# Patient Record
Sex: FEMALE | Race: WHITE | NOT HISPANIC OR LATINO | URBAN - METROPOLITAN AREA
[De-identification: names, ages, dates, MRNs, and addresses within clinical notes are randomized per-mention and may not be internally consistent; named-entity substitution may affect disease eponyms.]

---

## 2017-04-05 ENCOUNTER — NEW PATIENT (OUTPATIENT)
Dept: URBAN - METROPOLITAN AREA CLINIC 10 | Facility: CLINIC | Age: 72
End: 2017-04-05
Payer: MEDICARE

## 2017-04-05 DIAGNOSIS — H35.371 PUCKERING OF MACULA, RIGHT EYE: ICD-10-CM

## 2017-04-05 DIAGNOSIS — H01.011 ULCERATIVE BLEPHARITIS OF RIGHT UPPER LID: ICD-10-CM

## 2017-04-05 DIAGNOSIS — H25.813 COMBINED FORMS OF AGE-RELATED CATARACT, BILATERAL: Primary | ICD-10-CM

## 2017-04-05 DIAGNOSIS — H01.015 ULCERATIVE BLEPHARITIS OF LEFT LOWER LID: ICD-10-CM

## 2017-04-05 DIAGNOSIS — H01.012 ULCERATIVE BLEPHARITIS OF RIGHT LOWER LID: ICD-10-CM

## 2017-04-05 DIAGNOSIS — H43.812 VITREOUS DEGENERATION, LEFT EYE: ICD-10-CM

## 2017-04-05 DIAGNOSIS — H01.014 ULCERATIVE BLEPHARITIS OF LEFT UPPER LID: ICD-10-CM

## 2017-04-05 PROCEDURE — 92004 COMPRE OPH EXAM NEW PT 1/>: CPT | Performed by: OPTOMETRIST

## 2017-04-05 PROCEDURE — 92134 CPTRZ OPH DX IMG PST SGM RTA: CPT | Performed by: OPTOMETRIST

## 2017-04-05 ASSESSMENT — KERATOMETRY
OD: 44.50
OS: 45.25

## 2017-04-05 ASSESSMENT — VISUAL ACUITY
OD: 20/60
OS: 20/30

## 2017-04-05 ASSESSMENT — INTRAOCULAR PRESSURE
OS: 18
OD: 18

## 2017-04-26 ENCOUNTER — Encounter (OUTPATIENT)
Dept: URBAN - METROPOLITAN AREA CLINIC 10 | Facility: CLINIC | Age: 72
End: 2017-04-26
Payer: MEDICARE

## 2017-04-26 PROCEDURE — 92025 CPTRIZED CORNEAL TOPOGRAPHY: CPT | Performed by: OPHTHALMOLOGY

## 2017-04-26 PROCEDURE — 99213 OFFICE O/P EST LOW 20 MIN: CPT | Performed by: PHYSICIAN ASSISTANT

## 2017-04-26 RX ORDER — ALPRAZOLAM 0.5 MG/1
0.5 MG TABLET ORAL
Qty: 0 | Refills: 0 | Status: INACTIVE
Start: 2017-04-26 | End: 2017-10-11

## 2017-04-26 RX ORDER — FLUTICASONE PROPIONATE 50 UG/1
SPRAY, METERED NASAL
Qty: 0 | Refills: 0 | Status: ACTIVE
Start: 2017-04-26

## 2017-04-26 RX ORDER — ONDANSETRON 4 MG/1
4 MG TABLET, FILM COATED ORAL
Qty: 0 | Refills: 0 | Status: INACTIVE
Start: 2017-04-26 | End: 2020-12-16

## 2017-04-26 RX ORDER — LEVOTHYROXINE SODIUM 75 UG/1
TABLET ORAL
Qty: 0 | Refills: 0 | Status: INACTIVE
Start: 2017-04-26 | End: 2020-12-16

## 2017-06-16 ENCOUNTER — Encounter (OUTPATIENT)
Dept: URBAN - METROPOLITAN AREA CLINIC 10 | Facility: CLINIC | Age: 72
End: 2017-06-16
Payer: MEDICARE

## 2017-06-16 PROCEDURE — 99213 OFFICE O/P EST LOW 20 MIN: CPT | Performed by: PHYSICIAN ASSISTANT

## 2017-06-16 RX ORDER — VILAZODONE HYDROCHLORIDE 40 MG/1
40 MG TABLET ORAL
Qty: 0 | Refills: 0 | Status: INACTIVE
Start: 2017-06-16 | End: 2018-02-23

## 2017-06-16 RX ORDER — COLESEVELAM HYDROCHLORIDE 625 MG/1
625 MG TABLET, FILM COATED ORAL
Qty: 0 | Refills: 0 | Status: INACTIVE
Start: 2017-06-16 | End: 2017-10-11

## 2017-06-16 RX ORDER — RANITIDINE 300 MG/1
300 MG TABLET, FILM COATED ORAL
Qty: 0 | Refills: 0 | Status: INACTIVE
Start: 2017-06-16 | End: 2017-10-11

## 2017-06-21 ENCOUNTER — FOLLOW UP ESTABLISHED (OUTPATIENT)
Dept: URBAN - METROPOLITAN AREA CLINIC 44 | Facility: CLINIC | Age: 72
End: 2017-06-21
Payer: MEDICARE

## 2017-06-21 DIAGNOSIS — H25.811 COMBINED FORMS OF AGE-RELATED CATARACT, RIGHT EYE: Primary | ICD-10-CM

## 2017-06-21 DIAGNOSIS — H25.812 COMBINED FORMS OF AGE-RELATED CATARACT, LEFT EYE: ICD-10-CM

## 2017-06-21 PROCEDURE — 99213 OFFICE O/P EST LOW 20 MIN: CPT | Performed by: OPHTHALMOLOGY

## 2017-06-21 RX ORDER — DICLOFENAC SODIUM 1 MG/ML
0.1 % SOLUTION/ DROPS OPHTHALMIC
Qty: 1 | Refills: 2 | Status: INACTIVE
Start: 2017-06-21 | End: 2017-07-14

## 2017-06-21 RX ORDER — PREDNISOLONE ACETATE 10 MG/ML
1 % SUSPENSION/ DROPS OPHTHALMIC
Qty: 1 | Refills: 2 | Status: INACTIVE
Start: 2017-06-21 | End: 2017-07-14

## 2017-06-21 ASSESSMENT — INTRAOCULAR PRESSURE
OD: 17
OS: 18

## 2017-06-29 ENCOUNTER — SURGERY (OUTPATIENT)
Dept: URBAN - METROPOLITAN AREA SURGERY 5 | Facility: SURGERY | Age: 72
End: 2017-06-29
Payer: MEDICARE

## 2017-06-29 PROCEDURE — 66984 XCAPSL CTRC RMVL W/O ECP: CPT | Performed by: OPHTHALMOLOGY

## 2017-06-30 ENCOUNTER — POST OP (OUTPATIENT)
Dept: URBAN - METROPOLITAN AREA CLINIC 10 | Facility: CLINIC | Age: 72
End: 2017-06-30

## 2017-06-30 PROCEDURE — 99024 POSTOP FOLLOW-UP VISIT: CPT | Performed by: OPTOMETRIST

## 2017-06-30 ASSESSMENT — INTRAOCULAR PRESSURE: OD: 13

## 2017-07-05 ENCOUNTER — POST OP (OUTPATIENT)
Dept: URBAN - METROPOLITAN AREA CLINIC 10 | Facility: CLINIC | Age: 72
End: 2017-07-05

## 2017-07-05 PROCEDURE — 99024 POSTOP FOLLOW-UP VISIT: CPT | Performed by: OPTOMETRIST

## 2017-07-05 ASSESSMENT — VISUAL ACUITY
OD: 20/25
OS: 20/40

## 2017-07-05 ASSESSMENT — INTRAOCULAR PRESSURE
OS: 14
OD: 15

## 2017-07-13 ENCOUNTER — SURGERY (OUTPATIENT)
Dept: URBAN - METROPOLITAN AREA SURGERY 5 | Facility: SURGERY | Age: 72
End: 2017-07-13
Payer: MEDICARE

## 2017-07-13 PROCEDURE — 66984 XCAPSL CTRC RMVL W/O ECP: CPT | Performed by: OPHTHALMOLOGY

## 2017-07-14 ENCOUNTER — POST OP (OUTPATIENT)
Dept: URBAN - METROPOLITAN AREA CLINIC 10 | Facility: CLINIC | Age: 72
End: 2017-07-14

## 2017-07-14 PROCEDURE — 99024 POSTOP FOLLOW-UP VISIT: CPT | Performed by: OPTOMETRIST

## 2017-07-14 ASSESSMENT — VISUAL ACUITY: OD: 20/25

## 2017-07-14 ASSESSMENT — INTRAOCULAR PRESSURE
OS: 15
OD: 15

## 2017-08-03 ENCOUNTER — POST OP (OUTPATIENT)
Dept: URBAN - METROPOLITAN AREA CLINIC 64 | Facility: CLINIC | Age: 72
End: 2017-08-03

## 2017-08-03 PROCEDURE — 99024 POSTOP FOLLOW-UP VISIT: CPT | Performed by: OPTOMETRIST

## 2017-08-03 ASSESSMENT — INTRAOCULAR PRESSURE
OS: 19
OD: 15

## 2017-08-03 ASSESSMENT — VISUAL ACUITY
OS: 20/25
OD: 20/20

## 2017-10-11 ENCOUNTER — Encounter (OUTPATIENT)
Dept: URBAN - METROPOLITAN AREA CLINIC 10 | Facility: CLINIC | Age: 72
End: 2017-10-11
Payer: MEDICARE

## 2017-10-11 DIAGNOSIS — Z01.818 ENCOUNTER FOR OTHER PREPROCEDURAL EXAMINATION: Primary | ICD-10-CM

## 2017-10-11 PROCEDURE — 99213 OFFICE O/P EST LOW 20 MIN: CPT | Performed by: PHYSICIAN ASSISTANT

## 2017-10-18 ENCOUNTER — SURGERY (OUTPATIENT)
Dept: URBAN - METROPOLITAN AREA SURGERY 19 | Facility: SURGERY | Age: 72
End: 2017-10-18
Payer: MEDICARE

## 2017-10-18 PROCEDURE — 66821 AFTER CATARACT LASER SURGERY: CPT | Performed by: OPHTHALMOLOGY

## 2017-10-27 ENCOUNTER — POST OP (OUTPATIENT)
Dept: URBAN - METROPOLITAN AREA CLINIC 10 | Facility: CLINIC | Age: 72
End: 2017-10-27

## 2017-10-27 PROCEDURE — 99024 POSTOP FOLLOW-UP VISIT: CPT | Performed by: OPTOMETRIST

## 2017-10-27 ASSESSMENT — INTRAOCULAR PRESSURE
OD: 15
OS: 19

## 2017-10-27 ASSESSMENT — VISUAL ACUITY
OS: 20/25
OD: 20/20

## 2017-11-09 ENCOUNTER — SURGERY (OUTPATIENT)
Dept: URBAN - METROPOLITAN AREA SURGERY 5 | Facility: SURGERY | Age: 72
End: 2017-11-09
Payer: MEDICARE

## 2017-11-09 PROCEDURE — 66821 AFTER CATARACT LASER SURGERY: CPT | Performed by: OPHTHALMOLOGY

## 2018-02-23 ENCOUNTER — FOLLOW UP ESTABLISHED (OUTPATIENT)
Dept: URBAN - METROPOLITAN AREA CLINIC 10 | Facility: CLINIC | Age: 73
End: 2018-02-23
Payer: MEDICARE

## 2018-02-23 PROCEDURE — 92014 COMPRE OPH EXAM EST PT 1/>: CPT | Performed by: OPTOMETRIST

## 2018-02-23 ASSESSMENT — KERATOMETRY
OD: 44.50
OS: 44.50

## 2018-02-23 ASSESSMENT — VISUAL ACUITY
OD: 20/20
OS: 20/20

## 2018-02-23 ASSESSMENT — INTRAOCULAR PRESSURE
OD: 16
OS: 17

## 2018-09-14 ENCOUNTER — FOLLOW UP ESTABLISHED (OUTPATIENT)
Dept: URBAN - METROPOLITAN AREA CLINIC 64 | Facility: CLINIC | Age: 73
End: 2018-09-14
Payer: MEDICARE

## 2018-09-14 PROCEDURE — 92014 COMPRE OPH EXAM EST PT 1/>: CPT | Performed by: OPTOMETRIST

## 2018-09-14 PROCEDURE — 92134 CPTRZ OPH DX IMG PST SGM RTA: CPT | Performed by: OPTOMETRIST

## 2018-09-14 ASSESSMENT — INTRAOCULAR PRESSURE
OD: 14
OS: 15

## 2018-09-15 ENCOUNTER — SURGERY (OUTPATIENT)
Dept: URBAN - METROPOLITAN AREA SURGERY 5 | Facility: SURGERY | Age: 73
End: 2018-09-15
Payer: MEDICARE

## 2018-09-15 PROCEDURE — 67108 REPAIR DETACHED RETINA: CPT | Performed by: OPHTHALMOLOGY

## 2018-09-15 RX ORDER — OFLOXACIN 3 MG/ML
0.3 % SOLUTION/ DROPS OPHTHALMIC
Qty: 1 | Refills: 0 | Status: INACTIVE
Start: 2018-09-15 | End: 2018-09-27

## 2018-09-15 RX ORDER — PREDNISOLONE ACETATE 10 MG/ML
1 % SUSPENSION/ DROPS OPHTHALMIC
Qty: 1 | Refills: 1 | Status: INACTIVE
Start: 2018-09-15 | End: 2018-09-27

## 2018-09-15 ASSESSMENT — INTRAOCULAR PRESSURE
OS: 16
OD: 14

## 2018-09-27 ENCOUNTER — FOLLOW UP ESTABLISHED (OUTPATIENT)
Dept: URBAN - METROPOLITAN AREA CLINIC 64 | Facility: CLINIC | Age: 73
End: 2018-09-27
Payer: MEDICARE

## 2018-09-27 PROCEDURE — 92134 CPTRZ OPH DX IMG PST SGM RTA: CPT | Performed by: OPHTHALMOLOGY

## 2018-09-27 PROCEDURE — 99024 POSTOP FOLLOW-UP VISIT: CPT | Performed by: OPHTHALMOLOGY

## 2018-09-27 RX ORDER — PREDNISOLONE ACETATE 10 MG/ML
1 % SUSPENSION/ DROPS OPHTHALMIC
Qty: 1 | Refills: 1 | Status: INACTIVE
Start: 2018-09-27 | End: 2019-01-23

## 2018-09-27 ASSESSMENT — INTRAOCULAR PRESSURE
OD: 15
OS: 13

## 2018-10-11 ENCOUNTER — POST OP (OUTPATIENT)
Dept: URBAN - METROPOLITAN AREA CLINIC 64 | Facility: CLINIC | Age: 73
End: 2018-10-11

## 2018-10-11 PROCEDURE — 99024 POSTOP FOLLOW-UP VISIT: CPT | Performed by: OPTOMETRIST

## 2018-10-11 ASSESSMENT — INTRAOCULAR PRESSURE
OD: 13
OS: 14

## 2018-11-09 ENCOUNTER — Encounter (OUTPATIENT)
Dept: URBAN - METROPOLITAN AREA CLINIC 44 | Facility: CLINIC | Age: 73
End: 2018-11-09
Payer: MEDICARE

## 2018-11-09 DIAGNOSIS — H33.011 RETINAL DETACHMENT WITH SINGLE BREAK, RIGHT EYE: ICD-10-CM

## 2018-11-09 PROCEDURE — 99213 OFFICE O/P EST LOW 20 MIN: CPT | Performed by: PHYSICIAN ASSISTANT

## 2018-11-16 ENCOUNTER — SURGERY (OUTPATIENT)
Dept: URBAN - METROPOLITAN AREA SURGERY 5 | Facility: SURGERY | Age: 73
End: 2018-11-16
Payer: MEDICARE

## 2018-11-16 PROCEDURE — 67040 LASER TREATMENT OF RETINA: CPT | Performed by: OPHTHALMOLOGY

## 2018-11-16 RX ORDER — OFLOXACIN 3 MG/ML
0.3 % SOLUTION/ DROPS OPHTHALMIC
Qty: 1 | Refills: 0 | Status: INACTIVE
Start: 2018-11-16 | End: 2019-01-23

## 2018-11-17 ENCOUNTER — POST OP (OUTPATIENT)
Dept: URBAN - METROPOLITAN AREA CLINIC 10 | Facility: CLINIC | Age: 73
End: 2018-11-17

## 2018-11-17 PROCEDURE — 99024 POSTOP FOLLOW-UP VISIT: CPT | Performed by: OPTOMETRIST

## 2018-11-17 ASSESSMENT — INTRAOCULAR PRESSURE: OD: 8

## 2018-11-30 ENCOUNTER — FOLLOW UP ESTABLISHED (OUTPATIENT)
Dept: URBAN - METROPOLITAN AREA CLINIC 10 | Facility: CLINIC | Age: 73
End: 2018-11-30

## 2018-11-30 PROCEDURE — 99024 POSTOP FOLLOW-UP VISIT: CPT | Performed by: OPHTHALMOLOGY

## 2018-11-30 PROCEDURE — 92134 CPTRZ OPH DX IMG PST SGM RTA: CPT | Performed by: OPHTHALMOLOGY

## 2018-11-30 ASSESSMENT — INTRAOCULAR PRESSURE
OS: 16
OD: 15

## 2019-01-23 ENCOUNTER — POST OP (OUTPATIENT)
Dept: URBAN - METROPOLITAN AREA CLINIC 10 | Facility: CLINIC | Age: 74
End: 2019-01-23

## 2019-01-23 PROCEDURE — 99024 POSTOP FOLLOW-UP VISIT: CPT | Performed by: OPTOMETRIST

## 2019-01-23 RX ORDER — PREDNISOLONE/GATIFLOX/NEPAFEN 1-0.5-0.1%
1 % SUSPENSION, DROPS(FINAL DOSAGE FORM)(ML) OPHTHALMIC (EYE)
Qty: 1 | Refills: 1 | Status: INACTIVE
Start: 2019-01-23 | End: 2020-09-01

## 2019-01-23 ASSESSMENT — INTRAOCULAR PRESSURE
OS: 15
OD: 13

## 2020-09-01 ENCOUNTER — FOLLOW UP ESTABLISHED (OUTPATIENT)
Dept: URBAN - METROPOLITAN AREA CLINIC 64 | Facility: CLINIC | Age: 75
End: 2020-09-01
Payer: MEDICARE

## 2020-09-01 DIAGNOSIS — H02.31 BLEPHAROCHALASIS OF RIGHT UPPER LID: ICD-10-CM

## 2020-09-01 DIAGNOSIS — H10.011 ACUTE FOLLICULAR CONJUNCTIVITIS, RIGHT EYE: Primary | ICD-10-CM

## 2020-09-01 DIAGNOSIS — H02.34 BLEPHAROCHALASIS OF LEFT UPPER LID: ICD-10-CM

## 2020-09-01 PROCEDURE — 92012 INTRM OPH EXAM EST PATIENT: CPT | Performed by: OPTOMETRIST

## 2020-09-01 RX ORDER — PREDNISOLONE ACETATE 10 MG/ML
1 % SUSPENSION/ DROPS OPHTHALMIC
Qty: 1 | Refills: 1 | Status: INACTIVE
Start: 2020-09-01 | End: 2020-10-01

## 2020-09-01 ASSESSMENT — INTRAOCULAR PRESSURE
OD: 17
OS: 15

## 2020-09-15 ENCOUNTER — FOLLOW UP ESTABLISHED (OUTPATIENT)
Dept: URBAN - METROPOLITAN AREA CLINIC 64 | Facility: CLINIC | Age: 75
End: 2020-09-15
Payer: MEDICARE

## 2020-09-15 PROCEDURE — 99213 OFFICE O/P EST LOW 20 MIN: CPT | Performed by: OPTOMETRIST

## 2020-09-15 ASSESSMENT — KERATOMETRY
OD: 45.53
OS: 44.20

## 2020-09-29 ENCOUNTER — FOLLOW UP ESTABLISHED (OUTPATIENT)
Dept: URBAN - METROPOLITAN AREA CLINIC 64 | Facility: CLINIC | Age: 75
End: 2020-09-29
Payer: MEDICARE

## 2020-09-29 PROCEDURE — 99213 OFFICE O/P EST LOW 20 MIN: CPT | Performed by: OPTOMETRIST

## 2020-09-29 ASSESSMENT — INTRAOCULAR PRESSURE
OS: 8
OD: 10

## 2020-10-01 ENCOUNTER — FOLLOW UP ESTABLISHED (OUTPATIENT)
Dept: URBAN - METROPOLITAN AREA CLINIC 64 | Facility: CLINIC | Age: 75
End: 2020-10-01
Payer: MEDICARE

## 2020-10-01 PROCEDURE — 92012 INTRM OPH EXAM EST PATIENT: CPT | Performed by: STUDENT IN AN ORGANIZED HEALTH CARE EDUCATION/TRAINING PROGRAM

## 2020-10-01 RX ORDER — MOXIFLOXACIN HYDROCHLORIDE 5 MG/ML
0.5 % SOLUTION/ DROPS OPHTHALMIC
Qty: 1 | Refills: 0 | Status: INACTIVE
Start: 2020-10-01 | End: 2021-01-22

## 2020-10-01 RX ORDER — VALACYCLOVIR HYDROCHLORIDE 1 G/1
TABLET, FILM COATED ORAL
Qty: 20 | Refills: 0 | Status: ACTIVE
Start: 2020-10-01

## 2020-10-01 ASSESSMENT — INTRAOCULAR PRESSURE
OD: 16
OS: 14

## 2020-10-09 ENCOUNTER — FOLLOW UP ESTABLISHED (OUTPATIENT)
Dept: URBAN - METROPOLITAN AREA CLINIC 64 | Facility: CLINIC | Age: 75
End: 2020-10-09
Payer: MEDICARE

## 2020-10-09 DIAGNOSIS — H02.423 MYOGENIC PTOSIS OF BILATERAL EYELIDS: ICD-10-CM

## 2020-10-09 PROCEDURE — 92012 INTRM OPH EXAM EST PATIENT: CPT | Performed by: STUDENT IN AN ORGANIZED HEALTH CARE EDUCATION/TRAINING PROGRAM

## 2020-10-09 RX ORDER — AZITHROMYCIN MONOHYDRATE 250 MG/1
250 MG TABLET, FILM COATED ORAL
Qty: 6 | Refills: 0 | Status: INACTIVE
Start: 2020-10-09 | End: 2020-10-11

## 2020-10-16 ENCOUNTER — FOLLOW UP ESTABLISHED (OUTPATIENT)
Dept: URBAN - METROPOLITAN AREA CLINIC 64 | Facility: CLINIC | Age: 75
End: 2020-10-16
Payer: MEDICARE

## 2020-10-16 DIAGNOSIS — H10.821 ROSACEA CONJUNCTIVITIS, RIGHT EYE: Primary | ICD-10-CM

## 2020-10-16 PROCEDURE — 92012 INTRM OPH EXAM EST PATIENT: CPT | Performed by: STUDENT IN AN ORGANIZED HEALTH CARE EDUCATION/TRAINING PROGRAM

## 2020-10-16 RX ORDER — AZITHROMYCIN MONOHYDRATE 250 MG/1
250 MG TABLET, FILM COATED ORAL
Qty: 6 | Refills: 2 | Status: ACTIVE
Start: 2020-10-16

## 2020-10-27 ENCOUNTER — FOLLOW UP ESTABLISHED (OUTPATIENT)
Dept: URBAN - METROPOLITAN AREA CLINIC 64 | Facility: CLINIC | Age: 75
End: 2020-10-27
Payer: MEDICARE

## 2020-10-27 PROCEDURE — 92012 INTRM OPH EXAM EST PATIENT: CPT | Performed by: STUDENT IN AN ORGANIZED HEALTH CARE EDUCATION/TRAINING PROGRAM

## 2020-12-16 ENCOUNTER — FOLLOW UP ESTABLISHED (OUTPATIENT)
Dept: URBAN - METROPOLITAN AREA CLINIC 10 | Facility: CLINIC | Age: 75
End: 2020-12-16
Payer: MEDICARE

## 2020-12-16 DIAGNOSIS — H02.132 SENILE ECTROPION OF RIGHT LOWER EYELID: ICD-10-CM

## 2020-12-16 DIAGNOSIS — H02.135 SENILE ECTROPION OF LEFT LOWER EYELID: ICD-10-CM

## 2020-12-16 DIAGNOSIS — H04.523 EVERSION OF BILATERAL LACRIMAL PUNCTUM: ICD-10-CM

## 2020-12-16 PROCEDURE — 99214 OFFICE O/P EST MOD 30 MIN: CPT | Performed by: OPHTHALMOLOGY

## 2020-12-16 PROCEDURE — 99213 OFFICE O/P EST LOW 20 MIN: CPT | Performed by: PHYSICIAN ASSISTANT

## 2020-12-16 PROCEDURE — 92285 EXTERNAL OCULAR PHOTOGRAPHY: CPT | Performed by: OPHTHALMOLOGY

## 2021-01-08 ENCOUNTER — SURGERY (OUTPATIENT)
Dept: URBAN - METROPOLITAN AREA SURGERY 19 | Facility: SURGERY | Age: 76
End: 2021-01-08
Payer: MEDICARE

## 2021-01-08 PROCEDURE — 14060 TIS TRNFR E/N/E/L 10 SQ CM/<: CPT | Performed by: OPHTHALMOLOGY

## 2021-01-08 RX ORDER — ERYTHROMYCIN 5 MG/G
OINTMENT OPHTHALMIC
Qty: 1 | Refills: 1 | Status: INACTIVE
Start: 2021-01-08 | End: 2021-01-22

## 2021-01-18 ENCOUNTER — POST OP (OUTPATIENT)
Dept: URBAN - METROPOLITAN AREA CLINIC 44 | Facility: CLINIC | Age: 76
End: 2021-01-18
Payer: MEDICARE

## 2021-01-18 DIAGNOSIS — Z48.817 ENCOUNTER FOR SURGICAL AFTERCARE FOLLOWING SURGERY ON THE SKIN AND SUBCUTANEOUS TISSUE: Primary | ICD-10-CM

## 2021-01-18 PROCEDURE — 99024 POSTOP FOLLOW-UP VISIT: CPT | Performed by: OPTOMETRIST

## 2021-01-18 RX ORDER — NEOMYCIN SULFATE, POLYMYXIN B SULFATE AND DEXAMETHASONE 3.5; 10000; 1 MG/G; [USP'U]/G; MG/G
OINTMENT OPHTHALMIC
Qty: 3.5 | Refills: 1 | Status: ACTIVE
Start: 2021-01-18

## 2021-01-18 RX ORDER — NEOMYCIN SULFATE, POLYMYXIN B SULFATE AND DEXAMETHASONE 3.5; 10000; 1 MG/G; [USP'U]/G; MG/G
OINTMENT OPHTHALMIC
Qty: 3.5 | Refills: 1 | Status: INACTIVE
Start: 2021-01-18 | End: 2021-01-18

## 2021-01-22 ENCOUNTER — POST OP (OUTPATIENT)
Dept: URBAN - NONMETROPOLITAN AREA CLINIC 7 | Facility: CLINIC | Age: 76
End: 2021-01-22

## 2021-01-22 PROCEDURE — 99024 POSTOP FOLLOW-UP VISIT: CPT | Performed by: OPTOMETRIST

## 2021-01-22 RX ORDER — MOXIFLOXACIN 5 MG/ML
0.5 % SOLUTION/ DROPS OPHTHALMIC
Qty: 5 | Refills: 0 | Status: ACTIVE
Start: 2021-01-22

## 2021-01-22 ASSESSMENT — INTRAOCULAR PRESSURE
OS: 17
OD: 17

## 2021-01-29 ENCOUNTER — POST OP (OUTPATIENT)
Dept: URBAN - METROPOLITAN AREA CLINIC 64 | Facility: CLINIC | Age: 76
End: 2021-01-29

## 2021-01-29 DIAGNOSIS — Z96.1 PRESENCE OF INTRAOCULAR LENS: Primary | ICD-10-CM

## 2021-01-29 PROCEDURE — 99024 POSTOP FOLLOW-UP VISIT: CPT | Performed by: STUDENT IN AN ORGANIZED HEALTH CARE EDUCATION/TRAINING PROGRAM

## 2021-01-29 RX ORDER — PREDNISOLONE ACETATE 10 MG/ML
1 % SUSPENSION/ DROPS OPHTHALMIC
Qty: 5 | Refills: 1 | Status: ACTIVE
Start: 2021-01-29

## 2021-01-29 ASSESSMENT — INTRAOCULAR PRESSURE
OS: 10
OD: 10

## 2021-05-14 ENCOUNTER — OFFICE VISIT (OUTPATIENT)
Dept: URBAN - METROPOLITAN AREA CLINIC 64 | Facility: CLINIC | Age: 76
End: 2021-05-14
Payer: MEDICARE

## 2021-05-14 PROCEDURE — 99213 OFFICE O/P EST LOW 20 MIN: CPT | Performed by: STUDENT IN AN ORGANIZED HEALTH CARE EDUCATION/TRAINING PROGRAM

## 2021-05-14 ASSESSMENT — INTRAOCULAR PRESSURE
OS: 12
OD: 10

## 2021-05-14 ASSESSMENT — VISUAL ACUITY
OS: 20/20
OD: 20/60

## 2021-05-14 NOTE — IMPRESSION/PLAN
Impression: Rosacea conjunctivitis, right eye: H10.821. Plan: Improved today and stable ocular surface, recommend AT's prn.

## 2021-05-14 NOTE — IMPRESSION/PLAN
Impression: Retinal detachment with single break, right eye: H33.011.  Plan: Limits vision, new MRx for glasses today

## 2024-02-22 ENCOUNTER — APPOINTMENT (RX ONLY)
Dept: URBAN - METROPOLITAN AREA CLINIC 158 | Facility: CLINIC | Age: 79
Setting detail: DERMATOLOGY
End: 2024-02-22

## 2024-02-22 DIAGNOSIS — L40.0 PSORIASIS VULGARIS: ICD-10-CM

## 2024-02-22 DIAGNOSIS — L72.0 EPIDERMAL CYST: ICD-10-CM

## 2024-02-22 DIAGNOSIS — L40.59 OTHER PSORIATIC ARTHROPATHY: ICD-10-CM

## 2024-02-22 DIAGNOSIS — L71.8 OTHER ROSACEA: ICD-10-CM

## 2024-02-22 PROCEDURE — ? COUNSELING

## 2024-02-22 PROCEDURE — ? ADDITIONAL NOTES

## 2024-02-22 PROCEDURE — ? BENIGN DESTRUCTION COSMETIC

## 2024-02-22 PROCEDURE — ? ORDER TESTS

## 2024-02-22 PROCEDURE — ? PRESCRIPTION

## 2024-02-22 PROCEDURE — 99204 OFFICE O/P NEW MOD 45 MIN: CPT

## 2024-02-22 RX ORDER — METRONIDAZOLE 7.5 MG/G
CREAM TOPICAL ONCE DAILY
Qty: 45 | Refills: 3 | Status: ERX | COMMUNITY
Start: 2024-02-22

## 2024-02-22 RX ORDER — CLOBETASOL PROPIONATE 0.5 MG/ML
SOLUTION TOPICAL QHS
Qty: 50 | Refills: 3 | Status: ERX | COMMUNITY
Start: 2024-02-22

## 2024-02-22 RX ADMIN — CLOBETASOL PROPIONATE 1: 0.5 SOLUTION TOPICAL at 00:00

## 2024-02-22 RX ADMIN — METRONIDAZOLE 1: 7.5 CREAM TOPICAL at 00:00

## 2024-02-22 ASSESSMENT — LOCATION ZONE DERM
LOCATION ZONE: FACE
LOCATION ZONE: SCALP
LOCATION ZONE: SCALP

## 2024-02-22 ASSESSMENT — LOCATION DETAILED DESCRIPTION DERM
LOCATION DETAILED: RIGHT MEDIAL MALAR CHEEK
LOCATION DETAILED: LEFT CENTRAL TEMPLE
LOCATION DETAILED: LEFT INFERIOR MEDIAL MALAR CHEEK
LOCATION DETAILED: RIGHT SUPERIOR MEDIAL MALAR CHEEK
LOCATION DETAILED: RIGHT SUPERIOR CENTRAL MALAR CHEEK
LOCATION DETAILED: HAIR
LOCATION DETAILED: MID-FRONTAL SCALP
LOCATION DETAILED: LEFT CENTRAL MALAR CHEEK
LOCATION DETAILED: RIGHT CENTRAL MALAR CHEEK

## 2024-02-22 ASSESSMENT — LOCATION SIMPLE DESCRIPTION DERM
LOCATION SIMPLE: ANTERIOR SCALP
LOCATION SIMPLE: HAIR
LOCATION SIMPLE: LEFT CHEEK
LOCATION SIMPLE: LEFT TEMPLE
LOCATION SIMPLE: RIGHT CHEEK

## 2024-02-22 ASSESSMENT — BSA PSORIASIS: % BODY COVERED IN PSORIASIS: 9

## 2024-02-22 ASSESSMENT — PGA PSORIASIS: PGA PSORIASIS 2020: MODERATE

## 2024-02-22 NOTE — PROCEDURE: ADDITIONAL NOTES
Additional Notes: Patient states alcohol triggers the redness.
Detail Level: Simple
Render Risk Assessment In Note?: no

## 2024-02-22 NOTE — PROCEDURE: ORDER TESTS
Bill For Surgical Tray: no
Performing Laboratory: 0
Expected Date Of Service: 02/22/2024
Billing Type: Third-Party Bill

## 2024-04-04 ENCOUNTER — APPOINTMENT (RX ONLY)
Dept: URBAN - METROPOLITAN AREA CLINIC 159 | Facility: CLINIC | Age: 79
Setting detail: DERMATOLOGY
End: 2024-04-04

## 2024-04-04 DIAGNOSIS — L40.59 OTHER PSORIATIC ARTHROPATHY: ICD-10-CM | Status: INADEQUATELY CONTROLLED

## 2024-04-04 DIAGNOSIS — L40.0 PSORIASIS VULGARIS: ICD-10-CM | Status: INADEQUATELY CONTROLLED

## 2024-04-04 DIAGNOSIS — L73.8 OTHER SPECIFIED FOLLICULAR DISORDERS: ICD-10-CM

## 2024-04-04 DIAGNOSIS — L72.0 EPIDERMAL CYST: ICD-10-CM

## 2024-04-04 DIAGNOSIS — L82.0 INFLAMED SEBORRHEIC KERATOSIS: ICD-10-CM

## 2024-04-04 PROCEDURE — ? TREATMENT REGIMEN

## 2024-04-04 PROCEDURE — 17110 DESTRUCTION B9 LES UP TO 14: CPT

## 2024-04-04 PROCEDURE — ? LIQUID NITROGEN

## 2024-04-04 PROCEDURE — ? BENIGN DESTRUCTION COSMETIC

## 2024-04-04 PROCEDURE — ? TREMFYA INITIATION

## 2024-04-04 PROCEDURE — ? ADDITIONAL NOTES

## 2024-04-04 PROCEDURE — 99214 OFFICE O/P EST MOD 30 MIN: CPT | Mod: 25

## 2024-04-04 PROCEDURE — ? COUNSELING

## 2024-04-04 ASSESSMENT — LOCATION SIMPLE DESCRIPTION DERM
LOCATION SIMPLE: NOSE
LOCATION SIMPLE: LEFT FOREARM
LOCATION SIMPLE: LEFT UPPER BACK
LOCATION SIMPLE: CHEST
LOCATION SIMPLE: HAIR
LOCATION SIMPLE: RIGHT CHEEK
LOCATION SIMPLE: LEFT CHEEK

## 2024-04-04 ASSESSMENT — LOCATION DETAILED DESCRIPTION DERM
LOCATION DETAILED: RIGHT MEDIAL MALAR CHEEK
LOCATION DETAILED: HAIR
LOCATION DETAILED: RIGHT LATERAL SUPERIOR CHEST
LOCATION DETAILED: LEFT SUPERIOR MEDIAL UPPER BACK
LOCATION DETAILED: LEFT PROXIMAL DORSAL FOREARM
LOCATION DETAILED: LEFT LATERAL SUPERIOR CHEST
LOCATION DETAILED: LEFT MEDIAL SUPERIOR CHEST
LOCATION DETAILED: NASAL DORSUM
LOCATION DETAILED: LEFT MEDIAL MALAR CHEEK
LOCATION DETAILED: RIGHT MEDIAL SUPERIOR CHEST

## 2024-04-04 ASSESSMENT — LOCATION ZONE DERM
LOCATION ZONE: NOSE
LOCATION ZONE: FACE
LOCATION ZONE: TRUNK
LOCATION ZONE: ARM
LOCATION ZONE: SCALP

## 2024-04-04 NOTE — PROCEDURE: TREMFYA INITIATION
Pregnancy And Lactation Warning Text: The risk during pregnancy and breastfeeding is uncertain with this medication.
Is Cyclosporine Contraindicated?: Yes
Diagnosis (Required): Psoriasis
Is Soriatane Contraindicated?: No
Tremfya Monitoring Guidelines: A yearly test for tuberculosis is required while taking Tremfya.
Detail Level: Zone
Tremfya Dosing: 100 mg SC week 0 and week 4 then every 8 weeks thereafter

## 2024-04-04 NOTE — PROCEDURE: TREATMENT REGIMEN
Detail Level: Zone
Action 2: Continue
Continue Regimen: Clobetasol scalp solution
Samples Given: Differin gel samples

## 2024-12-02 ENCOUNTER — APPOINTMENT (RX ONLY)
Dept: URBAN - METROPOLITAN AREA CLINIC 168 | Facility: CLINIC | Age: 79
Setting detail: DERMATOLOGY
End: 2024-12-02

## 2024-12-02 DIAGNOSIS — L73.8 OTHER SPECIFIED FOLLICULAR DISORDERS: ICD-10-CM

## 2024-12-02 DIAGNOSIS — L72.0 EPIDERMAL CYST: ICD-10-CM

## 2024-12-02 DIAGNOSIS — L40.0 PSORIASIS VULGARIS: ICD-10-CM

## 2024-12-02 DIAGNOSIS — L40.59 OTHER PSORIATIC ARTHROPATHY: ICD-10-CM

## 2024-12-02 PROCEDURE — 96372 THER/PROPH/DIAG INJ SC/IM: CPT

## 2024-12-02 PROCEDURE — ? COUNSELING

## 2024-12-02 PROCEDURE — ? TREMFYA INJECTION

## 2024-12-02 PROCEDURE — ? TREMFYA INITIATION

## 2024-12-02 PROCEDURE — 99214 OFFICE O/P EST MOD 30 MIN: CPT | Mod: 25

## 2024-12-02 PROCEDURE — ? PRESCRIPTION

## 2024-12-02 PROCEDURE — ? TREATMENT REGIMEN

## 2024-12-02 PROCEDURE — ? BENIGN DESTRUCTION COSMETIC

## 2024-12-02 RX ORDER — TRETIONIN 0.25 MG/G
CREAM TOPICAL NIGHTLY
Qty: 45 | Refills: 3 | COMMUNITY
Start: 2024-12-02

## 2024-12-02 RX ORDER — CLOBETASOL PROPIONATE 0.5 MG/ML
SOLUTION TOPICAL QHS
Qty: 50 | Refills: 3

## 2024-12-02 RX ORDER — GUSELKUMAB 100 MG/ML
INJECTION SUBCUTANEOUS
Qty: 1 | Refills: 5 | Status: ERX | COMMUNITY
Start: 2024-12-02

## 2024-12-02 RX ADMIN — GUSELKUMAB: 100 INJECTION SUBCUTANEOUS at 00:00

## 2024-12-02 RX ADMIN — TRETIONIN: 0.25 CREAM TOPICAL at 00:00

## 2024-12-02 ASSESSMENT — LOCATION DETAILED DESCRIPTION DERM
LOCATION DETAILED: LEFT INFERIOR OCCIPITAL SCALP
LOCATION DETAILED: NASAL SUPRATIP
LOCATION DETAILED: HAIR
LOCATION DETAILED: LEFT LATERAL ABDOMEN
LOCATION DETAILED: LEFT SUPERIOR NASAL CHEEK
LOCATION DETAILED: LEFT NASAL SIDEWALL
LOCATION DETAILED: MID-OCCIPITAL SCALP
LOCATION DETAILED: RIGHT UPPER CUTANEOUS LIP
LOCATION DETAILED: RIGHT INFERIOR OCCIPITAL SCALP

## 2024-12-02 ASSESSMENT — LOCATION SIMPLE DESCRIPTION DERM
LOCATION SIMPLE: HAIR
LOCATION SIMPLE: ABDOMEN
LOCATION SIMPLE: POSTERIOR SCALP
LOCATION SIMPLE: RIGHT LIP
LOCATION SIMPLE: NOSE
LOCATION SIMPLE: LEFT NOSE
LOCATION SIMPLE: LEFT CHEEK

## 2024-12-02 ASSESSMENT — LOCATION ZONE DERM
LOCATION ZONE: SCALP
LOCATION ZONE: SCALP
LOCATION ZONE: FACE
LOCATION ZONE: NOSE
LOCATION ZONE: TRUNK
LOCATION ZONE: LIP

## 2024-12-02 ASSESSMENT — PGA PSORIASIS: PGA PSORIASIS 2020: MODERATE

## 2024-12-02 ASSESSMENT — ITCH NUMERIC RATING SCALE: HOW SEVERE IS YOUR ITCHING?: 6

## 2024-12-02 ASSESSMENT — BSA PSORIASIS: % BODY COVERED IN PSORIASIS: 5

## 2024-12-02 NOTE — PROCEDURE: TREMFYA INITIATION
Pregnancy And Lactation Warning Text: The risk during pregnancy and breastfeeding is uncertain with this medication.
Is Cyclosporine Contraindicated?: Yes
Diagnosis (Required): Psoriasis
Tremfya Monitoring Guidelines: A yearly test for tuberculosis is required while taking Tremfya.
Detail Level: Zone
Add Pregnancy And Lactation Warning To Medication Counseling?: No
Tremfya Dosing: 100 mg SC week 0 and week 4 then every 8 weeks thereafter

## 2024-12-02 NOTE — PROCEDURE: TREMFYA INJECTION
Administered By (Optional): Kaur
Use Enhanced Ndc?: Yes
J-Code: 
Was The Medication Purchased By The Clinic?: No
Lot # (Optional): [id number]
Additional Comments: ** sample was provided in office for loading dose today **
Other Time Frame Value: 4 weeks
Date Of Next Injection: Other Time Frame (Enter Below)
Syringe Size Used (Required For Enhanced Ndc): 100 mg/ml One-Press Injector
Treatment Number (Optional): 1
Tremfya Amount: 100 mg
Ndc (100 Mg/Mg Syringe): 59700-203-94
Expiration Date (Optional): 02/2026
Ndc (100 Mg/Mg Injector): 81708-428-42
Detail Level: None
Consent: The risks of pain and injection site reactions were reviewed with the patient prior to the injection.

## 2024-12-03 RX ORDER — GUSELKUMAB 100 MG/ML
INJECTION SUBCUTANEOUS
Qty: 1 | Refills: 5 | Status: ERX

## 2024-12-28 NOTE — PROCEDURE: ADDITIONAL NOTES
Detail Level: Simple
Additional Notes: Patient to get shingles vaccine before starting injections. Patient does experience joint pain, worse in the morning.
Render Risk Assessment In Note?: no
98